# Patient Record
Sex: MALE | URBAN - METROPOLITAN AREA
[De-identification: names, ages, dates, MRNs, and addresses within clinical notes are randomized per-mention and may not be internally consistent; named-entity substitution may affect disease eponyms.]

---

## 2019-07-08 ENCOUNTER — OFFICE VISIT (OUTPATIENT)
Dept: FAMILY MEDICINE | Facility: CLINIC | Age: 34
End: 2019-07-08
Payer: COMMERCIAL

## 2019-07-08 VITALS
RESPIRATION RATE: 16 BRPM | SYSTOLIC BLOOD PRESSURE: 112 MMHG | DIASTOLIC BLOOD PRESSURE: 84 MMHG | TEMPERATURE: 98.7 F | HEART RATE: 84 BPM | BODY MASS INDEX: 27.15 KG/M2 | WEIGHT: 173 LBS | HEIGHT: 67 IN

## 2019-07-08 DIAGNOSIS — L73.9 FOLLICULITIS: ICD-10-CM

## 2019-07-08 DIAGNOSIS — R21 RASH: Primary | ICD-10-CM

## 2019-07-08 LAB
KOH PREP SPEC: NORMAL
SPECIMEN SOURCE: NORMAL

## 2019-07-08 PROCEDURE — 99203 OFFICE O/P NEW LOW 30 MIN: CPT | Performed by: FAMILY MEDICINE

## 2019-07-08 PROCEDURE — 87220 TISSUE EXAM FOR FUNGI: CPT | Performed by: FAMILY MEDICINE

## 2019-07-08 RX ORDER — SULFAMETHOXAZOLE/TRIMETHOPRIM 800-160 MG
1 TABLET ORAL 2 TIMES DAILY
Qty: 20 TABLET | Refills: 0 | Status: SHIPPED | OUTPATIENT
Start: 2019-07-08 | End: 2019-07-18

## 2019-07-08 ASSESSMENT — MIFFLIN-ST. JEOR: SCORE: 1685.41

## 2019-07-08 NOTE — PROGRESS NOTES
"CC: 35 yo M presents for a rash.  He is new to the clinic.     Rash: first noticed about 6 months ago.  It is pruritic and sometimes bleeds with shaving the area, which he does regularly.  It is not painful.  He has had no associated fevers or chills.  The rash is no where else on his body.  It has spread slightly to involve more of the posterior scalp.  He has tried changing soaps/shampoos without effect.  He has been applying alcohol swabs to it.      He denies any other significant past medical or surgical history.     There is no pertinent family history.     He takes no medications.     ROS: a 7 point ROS is otherwise negative.       PE  /84   Pulse 84   Temp 98.7  F (37.1  C) (Oral)   Resp 16   Ht 1.689 m (5' 6.5\")   Wt 78.5 kg (173 lb)   BMI 27.50 kg/m    Gen: alert, NAD  SKIN: multiple erythematous papules to the posterior scalp no exudate, no swelling, induration, fluctuance, or scaling.  Otherwise clear.  Normal hair distribution.  HEENT: scalp as above.  PERRL, EOMI, sclerae and conjunctivae are clear.  Nares are clear without discharge.  No pharyngeal erythema or exudate.   Neck: supple, no LAD or thyromegaly  CV: RRR, no m/r/g  Resp: CTAB, normal respiratory effort  ABD: soft, active bowel sounds  Extr: no edema or obvious joint deformities  Neuro: normal gait and station  Psych: normal affect, mentation and mood.      A/P  1.  Rash: most consistent with bacterial folliculitis vs fungal folliculitis or tinea.  Advised cessation of shaving.  KOH is negative, therefore, will treat with oral bactrim.  If no response, or if worsening, would advise dermatology consultation (referral is placed).   Discussed that he should f/u or contact the clinic if any adverse reaction to the antibiotic.        "

## 2019-07-08 NOTE — PATIENT INSTRUCTIONS
Patient Education     Folliculitis  Folliculitis is an inflammation of a hair follicle. A hair follicle is the little pocket where a hair grows out of the skin. Bacteria normally live on the skin. But sometimes bacteria can get trapped in a follicle and cause infection. This causes a bumpy rash. The area over the follicles is red and raised. It may itch or be painful. The bumps may have fluid (pus) inside. The pus may leak and then form crusts. Sores can spread to other areas of the body. Once it goes away, folliculitis can come back at any time. Severe cases may cause permanent hair loss and scarring.  Folliculitis can happen anywhere on the body where hair grows. It can be caused by rubbing from tight clothing. Ingrown hairs can cause it. Soaking in a hot tub or swimming pool that has bacteria in the water can cause it. It may also occur if a hair follicle is blocked by a bandage.  Sores often go away in a few days with no treatment. In some cases, medicine may be given. A small piece of skin or pus may be taken to find the type of bacteria causing the infection.  Home care  The healthcare provider may prescribe an antibiotic cream or ointment.  Oral antibiotics may also be prescribed. Or you may be told to use an over-the-counter antibiotic cream. Follow all instructions when using any of these medicines.  General care    Apply warm, moist compresses to the sores for 20 minutes up to 3 times a day. You can make a compress by soaking a cloth in warm water. Squeeze out excess water.    Don t cut, poke, or squeeze the sores. This can be painful and spread infection.    Don t scratch the affected area. Scratching can delay healing.    Don t shave the areas affected by folliculitis.    If the sores leak fluid, cover the area with a nonstick gauze bandage. Use as little tape as possible. Carefully discard all soiled bandages.    Dress in loose cotton clothing.    Change sheets and blankets if they are soiled by pus.  Wash all clothes, towels, sheets, and cloth diapers in soap and hot water. Do not share clothes, towels, or sheets with other family members.    Do not soak the sores in bath water. This can spread infection. Instead, keep the area clean by gently washing sores with soap and warm water.    Wash your hands or use antibacterial gels often to prevent spreading the bacteria.  Follow-up care  Follow up with your healthcare provider, or as advised.  When to seek medical advice  Call your healthcare provider right away if any of these occur:    Fever of 100.4 F (38 C) or higher    Spreading of the rash    Rash does not get better with treatment    Redness or swelling that gets worse    Rash becomes more painful    Foul-smelling fluid leaking from the skin    Rash improves, but then comes back   Date Last Reviewed: 11/1/2016 2000-2018 [employer]. [address] [place] [address], [place] [address]. All rights reserved. This information is not intended as a substitute for professional medical care. Always follow your healthcare professional's instructions.

## 2020-03-02 ENCOUNTER — HEALTH MAINTENANCE LETTER (OUTPATIENT)
Age: 35
End: 2020-03-02

## 2020-12-20 ENCOUNTER — HEALTH MAINTENANCE LETTER (OUTPATIENT)
Age: 35
End: 2020-12-20

## 2021-04-24 ENCOUNTER — HEALTH MAINTENANCE LETTER (OUTPATIENT)
Age: 36
End: 2021-04-24

## 2021-10-03 ENCOUNTER — HEALTH MAINTENANCE LETTER (OUTPATIENT)
Age: 36
End: 2021-10-03

## 2022-05-15 ENCOUNTER — HEALTH MAINTENANCE LETTER (OUTPATIENT)
Age: 37
End: 2022-05-15

## 2022-09-10 ENCOUNTER — HEALTH MAINTENANCE LETTER (OUTPATIENT)
Age: 37
End: 2022-09-10

## 2023-06-03 ENCOUNTER — HEALTH MAINTENANCE LETTER (OUTPATIENT)
Age: 38
End: 2023-06-03